# Patient Record
Sex: FEMALE | Race: WHITE | NOT HISPANIC OR LATINO | Employment: FULL TIME | URBAN - NONMETROPOLITAN AREA
[De-identification: names, ages, dates, MRNs, and addresses within clinical notes are randomized per-mention and may not be internally consistent; named-entity substitution may affect disease eponyms.]

---

## 2017-05-23 ENCOUNTER — OFFICE VISIT (OUTPATIENT)
Dept: ORTHOPEDIC SURGERY | Facility: CLINIC | Age: 42
End: 2017-05-23

## 2017-05-23 DIAGNOSIS — M79.644 THUMB PAIN, RIGHT: Primary | ICD-10-CM

## 2017-05-23 PROCEDURE — 73140 X-RAY EXAM OF FINGER(S): CPT | Performed by: ORTHOPAEDIC SURGERY

## 2017-05-23 PROCEDURE — 99203 OFFICE O/P NEW LOW 30 MIN: CPT | Performed by: ORTHOPAEDIC SURGERY

## 2017-05-23 RX ORDER — HYDROCODONE BITARTRATE AND ACETAMINOPHEN 7.5; 325 MG/1; MG/1
TABLET ORAL
Refills: 0 | COMMUNITY
Start: 2017-05-05

## 2017-05-23 RX ORDER — AMLODIPINE BESYLATE 10 MG/1
TABLET ORAL
Refills: 11 | COMMUNITY
Start: 2017-05-15

## 2017-05-23 RX ORDER — DIAZEPAM 10 MG/1
TABLET ORAL
Refills: 5 | COMMUNITY
Start: 2017-05-19

## 2017-05-23 RX ORDER — PROMETHAZINE HYDROCHLORIDE 12.5 MG/1
TABLET ORAL
Refills: 2 | COMMUNITY
Start: 2017-03-18

## 2017-05-23 RX ORDER — ALBUTEROL SULFATE 90 UG/1
AEROSOL, METERED RESPIRATORY (INHALATION)
Refills: 1 | COMMUNITY
Start: 2017-04-07

## 2017-05-25 PROBLEM — M79.646 THUMB PAIN: Status: ACTIVE | Noted: 2017-05-25

## 2018-07-23 ENCOUNTER — CONVERSION ENCOUNTER (OUTPATIENT)
Dept: OTHER | Facility: HOSPITAL | Age: 43
End: 2018-07-23

## 2020-04-23 ENCOUNTER — HOSPITAL ENCOUNTER (OUTPATIENT)
Dept: PERIOP | Facility: HOSPITAL | Age: 45
Setting detail: HOSPITAL OUTPATIENT SURGERY
Discharge: HOME | End: 2020-04-24
Attending: SURGERY

## 2020-04-23 LAB
ALBUMIN SERPL-MCNC: 4.4 G/DL (ref 3.5–5)
ALBUMIN/GLOB SERPL: 1.5 {RATIO} (ref 1.4–2.6)
ALP SERPL-CCNC: 76 U/L (ref 42–98)
ALT SERPL-CCNC: 10 U/L (ref 10–40)
ANION GAP SERPL CALC-SCNC: 24 MMOL/L (ref 8–19)
APPEARANCE UR: CLEAR
AST SERPL-CCNC: 15 U/L (ref 15–50)
BASOPHILS # BLD AUTO: 0.03 10*3/UL (ref 0–0.2)
BASOPHILS NFR BLD AUTO: 0.3 % (ref 0–3)
BILIRUB SERPL-MCNC: 0.68 MG/DL (ref 0.2–1.3)
BILIRUB UR QL: NEGATIVE
BUN SERPL-MCNC: 30 MG/DL (ref 5–25)
BUN/CREAT SERPL: 6 {RATIO} (ref 6–20)
CALCIUM SERPL-MCNC: 10.1 MG/DL (ref 8.7–10.4)
CHLORIDE SERPL-SCNC: 88 MMOL/L (ref 99–111)
COLOR UR: YELLOW
CONV ABS IMM GRAN: 0.03 10*3/UL (ref 0–0.2)
CONV BACTERIA: NEGATIVE
CONV CO2: 18 MMOL/L (ref 22–32)
CONV COLLECTION SOURCE (UA): ABNORMAL
CONV IMMATURE GRAN: 0.3 % (ref 0–1.8)
CONV TOTAL PROTEIN: 7.4 G/DL (ref 6.3–8.2)
CONV UROBILINOGEN IN URINE BY AUTOMATED TEST STRIP: 0.2 {EHRLICHU}/DL (ref 0.1–1)
CREAT UR-MCNC: 4.69 MG/DL (ref 0.5–0.9)
DEPRECATED RDW RBC AUTO: 44.1 FL (ref 36.4–46.3)
EOSINOPHIL # BLD AUTO: 0.11 10*3/UL (ref 0–0.7)
EOSINOPHIL # BLD AUTO: 1 % (ref 0–7)
ERYTHROCYTE [DISTWIDTH] IN BLOOD BY AUTOMATED COUNT: 12.6 % (ref 11.7–14.4)
GFR SERPLBLD BASED ON 1.73 SQ M-ARVRAT: 11 ML/MIN/{1.73_M2}
GLOBULIN UR ELPH-MCNC: 3 G/DL (ref 2–3.5)
GLUCOSE SERPL-MCNC: 126 MG/DL (ref 65–99)
GLUCOSE UR QL: NEGATIVE MG/DL
HCG INTACT+B SERPL-ACNC: <0.5 M[IU]/ML (ref 0–5)
HCT VFR BLD AUTO: 37 % (ref 37–47)
HGB BLD-MCNC: 12.9 G/DL (ref 12–16)
HGB UR QL STRIP: ABNORMAL
KETONES UR QL STRIP: NEGATIVE MG/DL
LEUKOCYTE ESTERASE UR QL STRIP: ABNORMAL
LIPASE SERPL-CCNC: 43 U/L (ref 5–51)
LYMPHOCYTES # BLD AUTO: 1.13 10*3/UL (ref 1–5)
LYMPHOCYTES NFR BLD AUTO: 10.1 % (ref 20–45)
MCH RBC QN AUTO: 33.1 PG (ref 27–31)
MCHC RBC AUTO-ENTMCNC: 34.9 G/DL (ref 33–37)
MCV RBC AUTO: 94.9 FL (ref 81–99)
MONOCYTES # BLD AUTO: 0.42 10*3/UL (ref 0.2–1.2)
MONOCYTES NFR BLD AUTO: 3.7 % (ref 3–10)
NEUTROPHILS # BLD AUTO: 9.5 10*3/UL (ref 2–8)
NEUTROPHILS NFR BLD AUTO: 84.6 % (ref 30–85)
NITRITE UR QL STRIP: NEGATIVE
NRBC CBCN: 0 % (ref 0–0.7)
OSMOLALITY SERPL CALC.SUM OF ELEC: 270 MOSM/KG (ref 273–304)
PH UR STRIP.AUTO: 7.5 [PH] (ref 5–8)
PLATELET # BLD AUTO: 268 10*3/UL (ref 130–400)
PMV BLD AUTO: 9.3 FL (ref 9.4–12.3)
POTASSIUM SERPL-SCNC: 3.5 MMOL/L (ref 3.5–5.3)
PROT UR QL: 30 MG/DL
RBC # BLD AUTO: 3.9 10*6/UL (ref 4.2–5.4)
RBC #/AREA URNS HPF: ABNORMAL /[HPF]
SODIUM SERPL-SCNC: 126 MMOL/L (ref 135–147)
SP GR UR: 1.01 (ref 1–1.03)
SQUAMOUS SPT QL MICRO: ABNORMAL /[HPF]
TROPONIN T SERPL-MCNC: <0.01 NG/ML (ref 0–0.1)
WBC # BLD AUTO: 11.22 10*3/UL (ref 4.8–10.8)
WBC #/AREA URNS HPF: ABNORMAL /[HPF]

## 2020-04-28 ENCOUNTER — HOSPITAL ENCOUNTER (OUTPATIENT)
Dept: OTHER | Facility: HOSPITAL | Age: 45
Discharge: HOME | End: 2020-04-28
Attending: INTERNAL MEDICINE

## 2020-05-05 ENCOUNTER — CONVERSION ENCOUNTER (OUTPATIENT)
Dept: SURGERY | Facility: CLINIC | Age: 45
End: 2020-05-05

## 2020-05-05 ENCOUNTER — OFFICE VISIT CONVERTED (OUTPATIENT)
Dept: SURGERY | Facility: CLINIC | Age: 45
End: 2020-05-05
Attending: SURGERY

## 2020-12-03 ENCOUNTER — HOSPITAL ENCOUNTER (OUTPATIENT)
Dept: OTHER | Facility: HOSPITAL | Age: 45
Discharge: HOME | End: 2020-12-03
Attending: OBSTETRICS & GYNECOLOGY

## 2021-05-13 NOTE — PROGRESS NOTES
"   Progress Note      Patient Name: Helena Santos   Patient ID: [id number]   Sex: Female   YOB: 1975    Primary Care Provider: Dolores Andrews MD   Referring Provider: Dolores Andrews MD    Visit Date: May 5, 2020    Provider: Samm Fajardo MD   Location: [facility]   Location Address: [address] [place] [address]  [id number]   Location Phone: [phone]          Chief Complaint  · Status Post Surgery  · APPENDECTOMY      History Of Present Illness     Ms. Santos is a 44-year-old female who presents in follow-up after laparoscopic appendectomy. She has some ecchymosis, which is resolving, on her left side. She complains of right sided pain, which is likely postsurgical in nature.       Medication List  carvedilol 12.5 mg oral tablet; clonazepam 1 mg oral tablet; docusate sodium 100 mg oral capsule; escitalopram oxalate 10 mg oral tablet; hydrocodone-acetaminophen  mg oral tablet; ProAir HFA 90 mcg/actuation inhalation HFA aerosol inhaler; promethazine 25 mg oral tablet; sodium bicarbonate 650 mg oral tablet         Allergy List  * Other; NSAIDS; Shellfish allergy       Allergies Reconciled  Social History  Tobacco (Former)         Review of Systems  · Cardiovascular  o Denies  o : chest pain on exertion, shortness of breath, lower extremity swelling  · Respiratory  o Denies  o : wheezing, chronic cough, coughing up blood  · Gastrointestinal  o Denies  o : diarrhea, chronic abdominal pain, reflux symptoms      Vitals  Date Time BP Position Site L\R Cuff Size HR RR TEMP (F) WT  HT  BMI kg/m2 BSA m2 O2 Sat        05/05/2020 10:03 AM       14  131lbs 0oz 5'  8\" 19.92 1.69           Physical Examination     Her sutures were removed today.           Assessment  · Postoperative Exam Following Surgery     V67.00      Plan  · Medications  o Medications have been Reconciled  o Transition of Care or Provider Policy  · Instructions  o Electronically Identified Patient Education Materials " Provided Electronically  · Referrals  o ID: [id number] Date: 05/05/2020 Type: Inbound  Specialty: General Surgery     She was given Percocet 7.5 mg #25 no refills.             Electronically Signed by: Jayshree Smith-, -Author on May 6, 2020 03:42:17 PM  Electronically Co-signed by: Samm Fajardo MD -Reviewer on May 12, 2020 02:34:12 PM

## 2021-05-15 VITALS — HEIGHT: 68 IN | RESPIRATION RATE: 14 BRPM | WEIGHT: 131 LBS | BODY MASS INDEX: 19.85 KG/M2

## 2021-06-08 ENCOUNTER — TRANSCRIBE ORDERS (OUTPATIENT)
Dept: ADMINISTRATIVE | Facility: HOSPITAL | Age: 46
End: 2021-06-08

## 2021-06-08 DIAGNOSIS — R51.9 LEFT-SIDED HEADACHE: Primary | ICD-10-CM

## 2021-06-08 DIAGNOSIS — R20.2 NUMBNESS AND TINGLING IN LEFT HAND: ICD-10-CM

## 2021-06-08 DIAGNOSIS — R20.0 NUMBNESS AND TINGLING IN LEFT HAND: ICD-10-CM

## 2021-06-13 ENCOUNTER — APPOINTMENT (OUTPATIENT)
Dept: MRI IMAGING | Facility: HOSPITAL | Age: 46
End: 2021-06-13

## 2021-06-17 ENCOUNTER — APPOINTMENT (OUTPATIENT)
Dept: MRI IMAGING | Facility: HOSPITAL | Age: 46
End: 2021-06-17

## 2022-01-05 ENCOUNTER — HOSPITAL ENCOUNTER (EMERGENCY)
Dept: HOSPITAL 49 - FER | Age: 47
Discharge: OTHER INSTITUTION | End: 2022-01-05
Payer: MEDICARE

## 2022-01-05 DIAGNOSIS — R11.10: ICD-10-CM

## 2022-01-05 DIAGNOSIS — Z88.6: ICD-10-CM

## 2022-01-05 DIAGNOSIS — Z88.8: ICD-10-CM

## 2022-01-05 DIAGNOSIS — Z20.822: ICD-10-CM

## 2022-01-05 DIAGNOSIS — R10.9: Primary | ICD-10-CM

## 2022-01-05 DIAGNOSIS — R19.7: ICD-10-CM

## 2022-01-05 LAB
ALBUMIN SERPL-MCNC: 4.4 G/DL (ref 3.4–5)
ALKALINE PHOSHATASE: 62 U/L (ref 46–116)
ALT SERPL-CCNC: 26 U/L (ref 14–59)
AST: 17 U/L (ref 15–37)
BASOPHIL: 0.2 % (ref 0–2)
BILIRUBIN - TOTAL: 0.5 MG/DL (ref 0.2–1)
BILIRUBIN: NEGATIVE MG/DL
BLOOD: NEGATIVE ERY/UL
BUN SERPL-MCNC: 13 MG/DL (ref 7–18)
BUN/CREAT RATIO (CALC): 15.9 RATIO
C-REACTIVE PROTEIN: < 0.2 MG/DL (ref ?–0.9)
CHLORIDE: 103 MMOL/L (ref 98–107)
CLARITY UR: CLEAR
CO2 (BICARBONATE): 22 MMOL/L (ref 21–32)
COLOR: YELLOW
CREATININE: 0.82 MG/DL (ref 0.51–0.95)
EOSINOPHIL: 0.3 % (ref 0–5)
GLOBULIN (CALCULATION): 3.1 G/DL
GLUCOSE (U): NORMAL MG/DL
GLUCOSE SERPL-MCNC: 173 MG/DL (ref 74–106)
HCT: 35.1 % (ref 37–47)
HGB BLD-MCNC: 11.9 G/DL (ref 12.5–16)
LACTIC ACID: 1.2 MMOL/L (ref 0.4–1.9)
LEUKOCYTES: NEGATIVE LEU/UL
LYMPHOCYTE: 6 % (ref 15–48)
MAGNESIUM SERPL-MCNC: 1.6 MG/DL (ref 1.8–2.4)
MCH RBC QN AUTO: 31.1 PG (ref 25–31)
MCHC RBC AUTO-ENTMCNC: 33.9 G/DL (ref 32–36)
MCV: 91.6 FL (ref 78–100)
MONOCYTE: 4.4 % (ref 0–12)
MPV: 10.6 FL (ref 6–9.5)
NEUTROPHIL: 88.8 % (ref 41–80)
NITRITE: NEGATIVE MG/DL
NRBC: 0
PLT: 241 K/UL (ref 150–400)
POTASSIUM: 3.6 MMOL/L (ref 3.5–5.1)
PROTEIN: (no result) MG/DL
RBC MORPHOLOGY: NORMAL
RBC: 3.83 M/UL (ref 4.2–5.4)
RDW: 11.7 % (ref 11.5–14)
SPECIFIC GRAVITY: 1.02 (ref 1–1.03)
TOTAL PROTEIN: 7.5 G/DL (ref 6.4–8.2)
URINARY RBC: (no result)
URINARY WBC: (no result)
UROBILINOGEN: 0.2 MG/DL (ref 0.2–1)
WBC: 10.8 K/UL (ref 4–10.5)

## 2022-01-05 PROCEDURE — U0002 COVID-19 LAB TEST NON-CDC: HCPCS

## 2022-03-17 ENCOUNTER — TRANSCRIBE ORDERS (OUTPATIENT)
Dept: ADMINISTRATIVE | Facility: HOSPITAL | Age: 47
End: 2022-03-17

## 2022-03-17 DIAGNOSIS — Z12.31 VISIT FOR SCREENING MAMMOGRAM: Primary | ICD-10-CM

## 2022-04-29 ENCOUNTER — APPOINTMENT (OUTPATIENT)
Dept: MAMMOGRAPHY | Facility: HOSPITAL | Age: 47
End: 2022-04-29

## 2022-06-08 ENCOUNTER — HOSPITAL ENCOUNTER (OUTPATIENT)
Dept: MAMMOGRAPHY | Facility: HOSPITAL | Age: 47
Discharge: HOME | End: 2022-06-08
Admitting: INTERNAL MEDICINE

## 2022-06-08 DIAGNOSIS — Z12.31 VISIT FOR SCREENING MAMMOGRAM: ICD-10-CM

## 2022-06-08 PROCEDURE — 77063 BREAST TOMOSYNTHESIS BI: CPT

## 2022-06-08 PROCEDURE — 77067 SCR MAMMO BI INCL CAD: CPT

## 2022-07-05 ENCOUNTER — HOSPITAL ENCOUNTER (EMERGENCY)
Dept: HOSPITAL 49 - FER | Age: 47
Discharge: OTHER INSTITUTION | End: 2022-07-05
Payer: MEDICARE

## 2022-07-05 DIAGNOSIS — Z88.8: ICD-10-CM

## 2022-07-05 DIAGNOSIS — R10.32: ICD-10-CM

## 2022-07-05 DIAGNOSIS — Z88.6: ICD-10-CM

## 2022-07-05 DIAGNOSIS — R10.12: ICD-10-CM

## 2022-07-05 DIAGNOSIS — Z94.0: ICD-10-CM

## 2022-07-05 DIAGNOSIS — U07.1: Primary | ICD-10-CM

## 2022-07-05 LAB
ALBUMIN SERPL-MCNC: 3.9 G/DL (ref 3.4–5)
ALKALINE PHOSHATASE: 48 U/L (ref 46–116)
ALT SERPL-CCNC: 23 U/L (ref 14–59)
AST: 17 U/L (ref 15–37)
BASOPHIL: 0.2 % (ref 0–2)
BILIRUBIN - TOTAL: 0.3 MG/DL (ref 0.2–1)
BILIRUBIN: NEGATIVE MG/DL
BLOOD: (no result) ERY/UL
BUN SERPL-MCNC: 14 MG/DL (ref 7–18)
BUN/CREAT RATIO (CALC): 12.4 RATIO
CHLORIDE: 104 MMOL/L (ref 98–107)
CLARITY UR: CLEAR
CO2 (BICARBONATE): 26 MMOL/L (ref 21–32)
COLOR: YELLOW
CORONAVIRUS 2019 SARS-COV-2: POSITIVE
CREATININE: 1.13 MG/DL (ref 0.51–0.95)
EOSINOPHIL: 1 % (ref 0–5)
GLOBULIN (CALCULATION): 3.2 G/DL
GLUCOSE (U): NORMAL MG/DL
GLUCOSE SERPL-MCNC: 137 MG/DL (ref 74–106)
HCT: 32.8 % (ref 37–47)
HGB BLD-MCNC: 10.8 G/DL (ref 12.5–16)
INFLUENZA A NAA: NEGATIVE
LEUKOCYTES: NEGATIVE LEU/UL
LYMPHOCYTE: 9.1 % (ref 15–48)
MCH RBC QN AUTO: 29.3 PG (ref 25–31)
MCHC RBC AUTO-ENTMCNC: 32.9 G/DL (ref 32–36)
MCV: 89.1 FL (ref 78–100)
MONOCYTE: 5.1 % (ref 0–12)
MPV: 9.5 FL (ref 6–9.5)
NEUTROPHIL: 84.2 % (ref 41–80)
NITRITE: NEGATIVE MG/DL
NRBC: 0
PLT: 225 K/UL (ref 150–400)
POTASSIUM: 3.6 MMOL/L (ref 3.5–5.1)
PROTEIN: (no result) MG/DL
RBC MORPHOLOGY: NORMAL
RBC: 3.68 M/UL (ref 4.2–5.4)
RDW: 12.9 % (ref 11.5–14)
SPECIFIC GRAVITY: 1.02 (ref 1–1.03)
SQUAMOUS EPITHELIAL CELLS: (no result)
TOTAL PROTEIN: 7.1 G/DL (ref 6.4–8.2)
URINARY RBC: (no result)
UROBILINOGEN: 0.2 MG/DL (ref 0.2–1)
WBC: 15.4 K/UL (ref 4–10.5)

## 2022-07-05 PROCEDURE — U0002 COVID-19 LAB TEST NON-CDC: HCPCS

## 2022-07-08 ENCOUNTER — HOSPITAL ENCOUNTER (EMERGENCY)
Dept: HOSPITAL 49 - FER | Age: 47
Discharge: HOME | End: 2022-07-08
Payer: MEDICARE

## 2022-07-08 DIAGNOSIS — Z94.0: ICD-10-CM

## 2022-07-08 DIAGNOSIS — U07.1: Primary | ICD-10-CM

## 2022-07-08 DIAGNOSIS — I10: ICD-10-CM

## 2022-07-08 DIAGNOSIS — Z88.8: ICD-10-CM

## 2022-07-08 DIAGNOSIS — R11.15: ICD-10-CM

## 2022-07-08 DIAGNOSIS — Z28.310: ICD-10-CM

## 2022-07-08 DIAGNOSIS — Z88.6: ICD-10-CM

## 2022-07-08 LAB
BASOPHIL: 0.3 % (ref 0–2)
BUN SERPL-MCNC: 11 MG/DL (ref 7–18)
BUN/CREAT RATIO (CALC): 11 RATIO
CHLORIDE: 105 MMOL/L (ref 98–107)
CO2 (BICARBONATE): 22 MMOL/L (ref 21–32)
CREATININE: 0.96 MG/DL (ref 0.51–0.95)
EOSINOPHIL: 0.3 % (ref 0–5)
GLUCOSE SERPL-MCNC: 151 MG/DL (ref 74–106)
HCT: 29.1 % (ref 37–47)
HGB BLD-MCNC: 10.1 G/DL (ref 12.5–16)
LYMPHOCYTE: 7.9 % (ref 15–48)
MCH RBC QN AUTO: 29.7 PG (ref 25–31)
MCHC RBC AUTO-ENTMCNC: 34.7 G/DL (ref 32–36)
MCV: 85.6 FL (ref 78–100)
MONOCYTE: 3.4 % (ref 0–12)
MPV: 9.8 FL (ref 6–9.5)
NEUTROPHIL: 87.8 % (ref 41–80)
NRBC: 0
PLT: 192 K/UL (ref 150–400)
POTASSIUM: 3.4 MMOL/L (ref 3.5–5.1)
RBC MORPHOLOGY: NORMAL
RBC: 3.4 M/UL (ref 4.2–5.4)
RDW: 12.5 % (ref 11.5–14)
WBC: 7.6 K/UL (ref 4–10.5)

## 2022-08-30 ENCOUNTER — HOSPITAL ENCOUNTER (EMERGENCY)
Dept: HOSPITAL 49 - FER | Age: 47
Discharge: HOME | End: 2022-08-30
Payer: MEDICARE

## 2022-08-30 DIAGNOSIS — R10.13: Primary | ICD-10-CM

## 2022-08-30 DIAGNOSIS — Z28.310: ICD-10-CM

## 2022-08-30 DIAGNOSIS — Z88.6: ICD-10-CM

## 2022-08-30 DIAGNOSIS — Z20.822: ICD-10-CM

## 2022-08-30 DIAGNOSIS — R11.2: ICD-10-CM

## 2022-08-30 LAB
ALBUMIN SERPL-MCNC: 3.5 G/DL (ref 3.4–5)
ALKALINE PHOSHATASE: 45 U/L (ref 46–116)
ALT SERPL-CCNC: 22 U/L (ref 14–59)
AST: 16 U/L (ref 15–37)
BACTERIA: (no result)
BASOPHIL: 0.2 % (ref 0–2)
BILIRUBIN - TOTAL: 0.5 MG/DL (ref 0.2–1)
BILIRUBIN: NEGATIVE MG/DL
BLOOD: (no result) ERY/UL
BUN SERPL-MCNC: 12 MG/DL (ref 7–18)
BUN/CREAT RATIO (CALC): 11.2 RATIO
CHLORIDE: 107 MMOL/L (ref 98–107)
CLARITY UR: (no result)
CO2 (BICARBONATE): 25 MMOL/L (ref 21–32)
COLOR: YELLOW
CREATININE: 1.07 MG/DL (ref 0.51–0.95)
EOSINOPHIL: 0.2 % (ref 0–5)
GLOBULIN (CALCULATION): 3.1 G/DL
GLUCOSE (U): (no result) MG/DL
GLUCOSE SERPL-MCNC: 215 MG/DL (ref 74–106)
HCT: 26.7 % (ref 37–47)
HGB BLD-MCNC: 9 G/DL (ref 12.5–16)
LACTIC ACID: 1.4 MMOL/L (ref 0.4–1.9)
LEUKOCYTES: NEGATIVE LEU/UL
LIPASE: 50 U/L (ref 73–393)
LYMPHOCYTE: 8.7 % (ref 15–48)
MCH RBC QN AUTO: 29.7 PG (ref 25–31)
MCHC RBC AUTO-ENTMCNC: 33.7 G/DL (ref 32–36)
MCV: 88.1 FL (ref 78–100)
MONOCYTE: 2.9 % (ref 0–12)
MPV: 9.9 FL (ref 6–9.5)
NEUTROPHIL: 87.7 % (ref 41–80)
NITRITE: NEGATIVE MG/DL
NRBC: 0
PLT: 229 K/UL (ref 150–400)
POTASSIUM: 3.2 MMOL/L (ref 3.5–5.1)
PROTEIN: (no result) MG/DL
RBC MORPHOLOGY: NORMAL
RBC: 3.03 M/UL (ref 4.2–5.4)
RDW: 13.3 % (ref 11.5–14)
SPECIFIC GRAVITY: 1.01 (ref 1–1.03)
TOTAL PROTEIN: 6.6 G/DL (ref 6.4–8.2)
URINARY RBC: (no result)
URINARY WBC: (no result)
UROBILINOGEN: 0.2 MG/DL (ref 0.2–1)
WBC: 10 K/UL (ref 4–10.5)

## 2022-08-30 PROCEDURE — U0002 COVID-19 LAB TEST NON-CDC: HCPCS

## 2022-09-21 ENCOUNTER — HOSPITAL ENCOUNTER (EMERGENCY)
Dept: HOSPITAL 49 - FER | Age: 47
Discharge: HOME | End: 2022-09-21
Payer: MEDICARE

## 2022-09-21 DIAGNOSIS — Z88.8: ICD-10-CM

## 2022-09-21 DIAGNOSIS — Z91.013: ICD-10-CM

## 2022-09-21 DIAGNOSIS — R07.89: Primary | ICD-10-CM

## 2022-09-21 DIAGNOSIS — I10: ICD-10-CM

## 2022-09-21 LAB
ALBUMIN SERPL-MCNC: 3.2 G/DL (ref 3.4–5)
ALKALINE PHOSHATASE: 41 U/L (ref 46–116)
ALT SERPL-CCNC: 16 U/L (ref 14–59)
AST: 13 U/L (ref 15–37)
BASOPHIL: 0.5 % (ref 0–2)
BILIRUBIN - TOTAL: 0.4 MG/DL (ref 0.2–1)
BUN SERPL-MCNC: 11 MG/DL (ref 7–18)
BUN/CREAT RATIO (CALC): 10 RATIO
CHLORIDE: 105 MMOL/L (ref 98–107)
CO2 (BICARBONATE): 25 MMOL/L (ref 21–32)
CREATININE: 1.1 MG/DL (ref 0.51–0.95)
EOSINOPHIL: 3.2 % (ref 0–5)
GLOBULIN (CALCULATION): 3.2 G/DL
GLUCOSE SERPL-MCNC: 121 MG/DL (ref 74–106)
HCT: 27.9 % (ref 37–47)
HGB BLD-MCNC: 9 G/DL (ref 12.5–16)
LYMPHOCYTE: 16.1 % (ref 15–48)
MCH RBC QN AUTO: 29.2 PG (ref 25–31)
MCHC RBC AUTO-ENTMCNC: 32.3 G/DL (ref 32–36)
MCV: 90.6 FL (ref 78–100)
MONOCYTE: 7.7 % (ref 0–12)
MPV: 9.5 FL (ref 6–9.5)
NEUTROPHIL: 72 % (ref 41–80)
NRBC: 0
PLT: 240 K/UL (ref 150–400)
POTASSIUM: 4.1 MMOL/L (ref 3.5–5.1)
RBC MORPHOLOGY: NORMAL
RBC: 3.08 M/UL (ref 4.2–5.4)
RDW: 13 % (ref 11.5–14)
TOTAL PROTEIN: 6.4 G/DL (ref 6.4–8.2)
WBC: 4.4 K/UL (ref 4–10.5)

## 2022-11-30 ENCOUNTER — TRANSCRIBE ORDERS (OUTPATIENT)
Dept: LAB | Facility: HOSPITAL | Age: 47
End: 2022-11-30

## 2022-11-30 DIAGNOSIS — Z94.0 KIDNEY REPLACED BY TRANSPLANT: Primary | ICD-10-CM

## 2022-11-30 DIAGNOSIS — Z00.00 ROUTINE GENERAL MEDICAL EXAMINATION AT A HEALTH CARE FACILITY: ICD-10-CM

## 2022-11-30 DIAGNOSIS — Z79.891 ENCOUNTER FOR LONG-TERM METHADONE USE: ICD-10-CM

## 2022-12-02 ENCOUNTER — LAB (OUTPATIENT)
Dept: LAB | Facility: HOSPITAL | Age: 47
End: 2022-12-02
Payer: MEDICARE

## 2022-12-02 DIAGNOSIS — Z00.00 ROUTINE GENERAL MEDICAL EXAMINATION AT A HEALTH CARE FACILITY: ICD-10-CM

## 2022-12-02 DIAGNOSIS — Z94.0 KIDNEY REPLACED BY TRANSPLANT: ICD-10-CM

## 2022-12-02 DIAGNOSIS — Z79.891 ENCOUNTER FOR LONG-TERM METHADONE USE: ICD-10-CM

## 2022-12-02 LAB
027 TOXIN: NORMAL
C DIFF TOX GENS STL QL NAA+PROBE: NEGATIVE
LACTOFERRIN STL QL LA: NEGATIVE

## 2022-12-02 PROCEDURE — 87506 IADNA-DNA/RNA PROBE TQ 6-11: CPT

## 2022-12-02 PROCEDURE — 87206 SMEAR FLUORESCENT/ACID STAI: CPT

## 2022-12-02 PROCEDURE — 87015 SPECIMEN INFECT AGNT CONCNTJ: CPT

## 2022-12-02 PROCEDURE — 87493 C DIFF AMPLIFIED PROBE: CPT | Performed by: INTERNAL MEDICINE

## 2022-12-02 PROCEDURE — 87207 SMEAR SPECIAL STAIN: CPT

## 2022-12-02 PROCEDURE — 83630 LACTOFERRIN FECAL (QUAL): CPT

## 2022-12-04 LAB
C COLI+JEJ+UPSA DNA STL QL NAA+NON-PROBE: NOT DETECTED
CRYPTOSP DNA STL QL NAA+NON-PROBE: NOT DETECTED
E HISTOLYT DNA STL QL NAA+NON-PROBE: NOT DETECTED
EC STX1+STX2 GENES STL QL NAA+NON-PROBE: NOT DETECTED
G LAMBLIA DNA STL QL NAA+NON-PROBE: NOT DETECTED
S ENT+BONG DNA STL QL NAA+NON-PROBE: NOT DETECTED
SHIGELLA SP+EIEC IPAH ST NAA+NON-PROBE: NOT DETECTED

## 2022-12-05 LAB — SPECIMEN STATUS: NORMAL

## 2022-12-08 LAB
CRYPTOSP STL QL ACID FAST STN: NORMAL
CYCLOSPORA STL ACID FAST STN: NORMAL
I BELLI SPEC QL ACID FAST MOD KINY STN: NORMAL

## 2022-12-16 LAB — MICROSPORID STL MOD TRI STAIN: NEGATIVE

## 2023-11-10 ENCOUNTER — TRANSCRIBE ORDERS (OUTPATIENT)
Dept: ADMINISTRATIVE | Facility: HOSPITAL | Age: 48
End: 2023-11-10
Payer: MEDICARE

## 2023-11-10 DIAGNOSIS — Z12.31 ENCOUNTER FOR SCREENING MAMMOGRAM FOR BREAST CANCER: Primary | ICD-10-CM

## 2023-12-04 ENCOUNTER — HOSPITAL ENCOUNTER (OUTPATIENT)
Dept: MAMMOGRAPHY | Facility: HOSPITAL | Age: 48
Discharge: HOME | End: 2023-12-04
Admitting: FAMILY MEDICINE
Payer: MEDICARE

## 2023-12-04 DIAGNOSIS — Z12.31 ENCOUNTER FOR SCREENING MAMMOGRAM FOR BREAST CANCER: ICD-10-CM

## 2023-12-04 PROCEDURE — 77067 SCR MAMMO BI INCL CAD: CPT

## 2023-12-04 PROCEDURE — 77063 BREAST TOMOSYNTHESIS BI: CPT
